# Patient Record
Sex: FEMALE | Race: WHITE | NOT HISPANIC OR LATINO | URBAN - NONMETROPOLITAN AREA
[De-identification: names, ages, dates, MRNs, and addresses within clinical notes are randomized per-mention and may not be internally consistent; named-entity substitution may affect disease eponyms.]

---

## 2024-02-06 ENCOUNTER — APPOINTMENT (RX ONLY)
Dept: URBAN - NONMETROPOLITAN AREA CLINIC 13 | Facility: CLINIC | Age: 29
Setting detail: DERMATOLOGY
End: 2024-02-06

## 2024-02-06 DIAGNOSIS — L21.8 OTHER SEBORRHEIC DERMATITIS: ICD-10-CM

## 2024-02-06 DIAGNOSIS — L57.8 OTHER SKIN CHANGES DUE TO CHRONIC EXPOSURE TO NONIONIZING RADIATION: ICD-10-CM

## 2024-02-06 DIAGNOSIS — L82.1 OTHER SEBORRHEIC KERATOSIS: ICD-10-CM

## 2024-02-06 DIAGNOSIS — D18.0 HEMANGIOMA: ICD-10-CM

## 2024-02-06 DIAGNOSIS — Z71.89 OTHER SPECIFIED COUNSELING: ICD-10-CM

## 2024-02-06 DIAGNOSIS — D22 MELANOCYTIC NEVI: ICD-10-CM

## 2024-02-06 PROBLEM — D22.5 MELANOCYTIC NEVI OF TRUNK: Status: ACTIVE | Noted: 2024-02-06

## 2024-02-06 PROBLEM — D18.01 HEMANGIOMA OF SKIN AND SUBCUTANEOUS TISSUE: Status: ACTIVE | Noted: 2024-02-06

## 2024-02-06 PROCEDURE — ? COUNSELING

## 2024-02-06 PROCEDURE — 99203 OFFICE O/P NEW LOW 30 MIN: CPT

## 2024-02-06 PROCEDURE — ? ADDITIONAL NOTES

## 2024-02-06 ASSESSMENT — LOCATION SIMPLE DESCRIPTION DERM
LOCATION SIMPLE: RIGHT POSTERIOR UPPER ARM
LOCATION SIMPLE: LEFT OCCIPITAL SCALP
LOCATION SIMPLE: LEFT UPPER BACK
LOCATION SIMPLE: RIGHT UPPER BACK

## 2024-02-06 ASSESSMENT — LOCATION ZONE DERM
LOCATION ZONE: SCALP
LOCATION ZONE: ARM
LOCATION ZONE: TRUNK

## 2024-02-06 ASSESSMENT — LOCATION DETAILED DESCRIPTION DERM
LOCATION DETAILED: RIGHT SUPERIOR UPPER BACK
LOCATION DETAILED: LEFT SUPERIOR OCCIPITAL SCALP
LOCATION DETAILED: RIGHT MID-UPPER BACK
LOCATION DETAILED: RIGHT DISTAL POSTERIOR UPPER ARM
LOCATION DETAILED: LEFT SUPERIOR UPPER BACK
LOCATION DETAILED: LEFT MID-UPPER BACK

## 2024-02-06 NOTE — PROCEDURE: ADDITIONAL NOTES
Additional Notes: Pt likes to manage symptoms with diet. Natural option - coconut oil. Discussed prescription  medication, patient declines at this time but will contact office if she becomes interested. - Fluocinolone oil
Detail Level: Simple
Render Risk Assessment In Note?: no

## 2024-02-16 NOTE — HPI: FULL BODY SKIN EXAMINATION
74 year ofd female with hx of cephalomedullary nail fixation left proximal femur who was admitted recently to [facility] for septic joint-  s/p I&D and antibiotic spacer placement on 1/19/24 and repeat I&D on 1/25, aspiration cultures with MRSA, hospital course notable for MRSA bacteremia which she cleared quickly, TTE neg, on IV daptomycin for six weeks with OPAT. Patient refused SNF placement at the time of discharge. I saw her in clinic and there was concern regarding OPAT adherence.     The patient states that she was using her walker, and then suddenly felt weak and fell with direct impact over her left hip prosthesis. She denies fevers, rigors, chills, purulence from surgical site incision. Imaging showed a fracture involving prosthesis of her left hip joint, she was seen by orthopedic surgery, and she subsequently was taken to the OR today for revision surgery, which involved some irrigation and debridement, operative cultures are incubating.     She is now admitted to [facility] after a fall with direct impact over her left hip resulting in a fracture of her prosthesis, afebrile, hemodynamically stable, blood cultures with no growth, and without leukocytes. S/p operative intervention with ortho- I&D and revision placement of articulating cement coated antibiotic spacer left hip, operative cultures incubating.       
What Is The Reason For Today's Visit?: Full Body Skin Examination
What Is The Reason For Today's Visit? (Being Monitored For X): concerning skin lesions on an annual basis